# Patient Record
Sex: FEMALE | NOT HISPANIC OR LATINO | URBAN - METROPOLITAN AREA
[De-identification: names, ages, dates, MRNs, and addresses within clinical notes are randomized per-mention and may not be internally consistent; named-entity substitution may affect disease eponyms.]

---

## 2022-08-10 ENCOUNTER — OFFICE VISIT (OUTPATIENT)
Dept: URBAN - METROPOLITAN AREA CLINIC 92 | Facility: CLINIC | Age: 57
End: 2022-08-10

## 2024-09-11 ENCOUNTER — LAB OUTSIDE AN ENCOUNTER (OUTPATIENT)
Dept: URBAN - METROPOLITAN AREA CLINIC 92 | Facility: CLINIC | Age: 59
End: 2024-09-11

## 2024-09-11 ENCOUNTER — OFFICE VISIT (OUTPATIENT)
Dept: URBAN - METROPOLITAN AREA CLINIC 92 | Facility: CLINIC | Age: 59
End: 2024-09-11
Payer: MEDICARE

## 2024-09-11 ENCOUNTER — DASHBOARD ENCOUNTERS (OUTPATIENT)
Age: 59
End: 2024-09-11

## 2024-09-11 VITALS — HEIGHT: 67 IN | BODY MASS INDEX: 45.99 KG/M2 | WEIGHT: 293 LBS | TEMPERATURE: 96.8 F

## 2024-09-11 DIAGNOSIS — R12 HEARTBURN: ICD-10-CM

## 2024-09-11 DIAGNOSIS — Z80.0 FAMILY HISTORY OF COLON CANCER: ICD-10-CM

## 2024-09-11 PROCEDURE — 99204 OFFICE O/P NEW MOD 45 MIN: CPT | Performed by: INTERNAL MEDICINE

## 2024-09-11 RX ORDER — SUCRALFATE 1 G/1
1 TABLET ON AN EMPTY STOMACH TABLET ORAL TWICE A DAY
Status: ACTIVE | COMMUNITY

## 2024-09-11 RX ORDER — PANTOPRAZOLE SODIUM 40 MG/1
1 TABLET TABLET, DELAYED RELEASE ORAL ONCE A DAY
Status: ACTIVE | COMMUNITY

## 2024-09-11 RX ORDER — OLMESARTAN MEDOXOMIL 40 MG/1
1 TABLET TABLET, FILM COATED ORAL ONCE A DAY
Status: ACTIVE | COMMUNITY

## 2024-09-11 RX ORDER — SERTRALINE HYDROCHLORIDE 100 MG/1
1 TABLET TABLET, FILM COATED ORAL ONCE A DAY
Status: ACTIVE | COMMUNITY

## 2024-09-11 RX ORDER — GABAPENTIN 600 MG/1
1 TABLET TABLET, FILM COATED ORAL ONCE A DAY
Status: ACTIVE | COMMUNITY

## 2024-09-11 RX ORDER — APIXABAN 2.5 MG/1
AS DIRECTED TABLET, FILM COATED ORAL
Status: ACTIVE | COMMUNITY

## 2024-09-11 NOTE — HPI-TODAY'S VISIT:
59yF with a hx of DVT on Eliquis, morbid obesity s/p RYGB c/b gastrogastric fistula 2017, HTN, gallstone panc 2016 s/p CCY, who p/w GERD and fam hx of CRC. One maternal uncle and a maternal aunt with CRC. EGD/colon in 9/2020 with LA A esophagitis, gastritis, normal colonoscopy. Has HB and takes pantoprazole daily or PRN. She tells me that she needs  colonoscopies every 3 years.

## 2024-09-11 NOTE — EXAM-PHYSICAL EXAM
CONSTITUTIONAL - well-developed, well-nourished, NAD HEENT - sclerae and conjuntivae appear normal, external nose normal appearance, no nasal discharge NECK - normal appearance, no deformities CHEST - no increased work of breathing, no accessory muscle use CV - no edema, regular rate GI - soft, NTND, no guarding or rigidity, no palpable masses or HSM, morbidly obese  MSK - no deformities, normal gait SKIN - good turgor, no obvious rashes NEURO - AAOx3 PSYCH - normal mood and appropriate affect